# Patient Record
Sex: MALE | Race: BLACK OR AFRICAN AMERICAN
[De-identification: names, ages, dates, MRNs, and addresses within clinical notes are randomized per-mention and may not be internally consistent; named-entity substitution may affect disease eponyms.]

---

## 2019-08-12 ENCOUNTER — HOSPITAL ENCOUNTER (EMERGENCY)
Dept: HOSPITAL 61 - ER | Age: 44
Discharge: HOME | End: 2019-08-12
Payer: SELF-PAY

## 2019-08-12 VITALS — HEIGHT: 66 IN | WEIGHT: 270 LBS | BODY MASS INDEX: 43.39 KG/M2

## 2019-08-12 VITALS — SYSTOLIC BLOOD PRESSURE: 171 MMHG | DIASTOLIC BLOOD PRESSURE: 97 MMHG

## 2019-08-12 DIAGNOSIS — F17.200: ICD-10-CM

## 2019-08-12 DIAGNOSIS — J20.9: Primary | ICD-10-CM

## 2019-08-12 DIAGNOSIS — J45.909: ICD-10-CM

## 2019-08-12 PROCEDURE — 96372 THER/PROPH/DIAG INJ SC/IM: CPT

## 2019-08-12 PROCEDURE — 71045 X-RAY EXAM CHEST 1 VIEW: CPT

## 2019-08-12 PROCEDURE — 99284 EMERGENCY DEPT VISIT MOD MDM: CPT

## 2019-08-12 PROCEDURE — 94640 AIRWAY INHALATION TREATMENT: CPT

## 2019-08-12 NOTE — RAD
Chest radiograph 8/12/2019 3:08 PM

 

INDICATION: Pneumonia, chronic White cough

 

COMPARISON: None available

 

TECHNIQUE: Frontal view of the chest is provided.

 

FINDINGS:

 

The cardiomediastinal silhouette is within normal limits.

 

There are no pleural effusions. There is no pulmonary vascular congestion.

There is no pneumothorax.

 

The lungs are clear. Minimal subsegmental atelectasis at the right lung 

base. Bronchial wall thickening may reflect bronchitis.

 

No significant osseous abnormality is identified.

 

IMPRESSION:

 

Bronchial wall thickening suggestive of bronchitis. There is suggestion of

subsegmental atelectasis at the right lung base.

 

Electronically signed by: Charissa Meyers MD (8/12/2019 3:24 PM) 

[id number]

## 2020-01-01 ENCOUNTER — HOSPITAL ENCOUNTER (EMERGENCY)
Dept: HOSPITAL 61 - ER | Age: 45
Discharge: HOME | End: 2020-01-01
Payer: SELF-PAY

## 2020-01-01 VITALS
DIASTOLIC BLOOD PRESSURE: 98 MMHG | DIASTOLIC BLOOD PRESSURE: 98 MMHG | DIASTOLIC BLOOD PRESSURE: 98 MMHG | SYSTOLIC BLOOD PRESSURE: 154 MMHG | SYSTOLIC BLOOD PRESSURE: 154 MMHG | SYSTOLIC BLOOD PRESSURE: 154 MMHG | DIASTOLIC BLOOD PRESSURE: 98 MMHG | SYSTOLIC BLOOD PRESSURE: 154 MMHG

## 2020-01-01 VITALS — BODY MASS INDEX: 43.32 KG/M2 | HEIGHT: 65 IN | WEIGHT: 260 LBS

## 2020-01-01 DIAGNOSIS — Z79.82: ICD-10-CM

## 2020-01-01 DIAGNOSIS — Z98.890: ICD-10-CM

## 2020-01-01 DIAGNOSIS — Z87.448: ICD-10-CM

## 2020-01-01 DIAGNOSIS — F17.210: ICD-10-CM

## 2020-01-01 DIAGNOSIS — R07.89: ICD-10-CM

## 2020-01-01 DIAGNOSIS — M54.12: Primary | ICD-10-CM

## 2020-01-01 DIAGNOSIS — J40: ICD-10-CM

## 2020-01-01 DIAGNOSIS — J45.909: ICD-10-CM

## 2020-01-01 DIAGNOSIS — F12.90: ICD-10-CM

## 2020-01-01 LAB
ALBUMIN SERPL-MCNC: 3.7 G/DL (ref 3.4–5)
ALBUMIN/GLOB SERPL: 1 {RATIO} (ref 1–1.7)
ALP SERPL-CCNC: 49 U/L (ref 46–116)
ALT SERPL-CCNC: 31 U/L (ref 16–63)
ANION GAP SERPL CALC-SCNC: 5 MMOL/L (ref 6–14)
AST SERPL-CCNC: 21 U/L (ref 15–37)
BASOPHILS # BLD AUTO: 0.1 X10^3/UL (ref 0–0.2)
BASOPHILS NFR BLD: 1 % (ref 0–3)
BILIRUB SERPL-MCNC: 0.4 MG/DL (ref 0.2–1)
BUN SERPL-MCNC: 10 MG/DL (ref 8–26)
BUN/CREAT SERPL: 10 (ref 6–20)
CALCIUM SERPL-MCNC: 9.3 MG/DL (ref 8.5–10.1)
CHLORIDE SERPL-SCNC: 105 MMOL/L (ref 98–107)
CK SERPL-CCNC: 341 U/L (ref 39–308)
CO2 SERPL-SCNC: 29 MMOL/L (ref 21–32)
CREAT SERPL-MCNC: 1 MG/DL (ref 0.7–1.3)
EOSINOPHIL NFR BLD: 0.2 X10^3/UL (ref 0–0.7)
EOSINOPHIL NFR BLD: 2 % (ref 0–3)
ERYTHROCYTE [DISTWIDTH] IN BLOOD BY AUTOMATED COUNT: 13.5 % (ref 11.5–14.5)
GFR SERPLBLD BASED ON 1.73 SQ M-ARVRAT: 97.3 ML/MIN
GLOBULIN SER-MCNC: 3.8 G/DL (ref 2.2–3.8)
GLUCOSE SERPL-MCNC: 93 MG/DL (ref 70–99)
HCT VFR BLD CALC: 46.3 % (ref 39–53)
HGB BLD-MCNC: 15.6 G/DL (ref 13–17.5)
INFLUENZA A PATIENT: NEGATIVE
INFLUENZA B PATIENT: NEGATIVE
LIPASE: 64 U/L (ref 73–393)
LYMPHOCYTES # BLD: 1.9 X10^3/UL (ref 1–4.8)
LYMPHOCYTES NFR BLD AUTO: 18 % (ref 24–48)
MAGNESIUM SERPL-MCNC: 2 MG/DL (ref 1.8–2.4)
MCH RBC QN AUTO: 31 PG (ref 25–35)
MCHC RBC AUTO-ENTMCNC: 34 G/DL (ref 31–37)
MCV RBC AUTO: 92 FL (ref 79–100)
MONO #: 0.9 X10^3/UL (ref 0–1.1)
MONOCYTES NFR BLD: 9 % (ref 0–9)
NEUT #: 7.5 X10^3/UL (ref 1.8–7.7)
NEUTROPHILS NFR BLD AUTO: 71 % (ref 31–73)
PLATELET # BLD AUTO: 273 X10^3/UL (ref 140–400)
POTASSIUM SERPL-SCNC: 4.2 MMOL/L (ref 3.5–5.1)
PROT SERPL-MCNC: 7.5 G/DL (ref 6.4–8.2)
RBC # BLD AUTO: 5.01 X10^6/UL (ref 4.3–5.7)
SODIUM SERPL-SCNC: 139 MMOL/L (ref 136–145)
WBC # BLD AUTO: 10.5 X10^3/UL (ref 4–11)

## 2020-01-01 PROCEDURE — 83880 ASSAY OF NATRIURETIC PEPTIDE: CPT

## 2020-01-01 PROCEDURE — 71046 X-RAY EXAM CHEST 2 VIEWS: CPT

## 2020-01-01 PROCEDURE — 99285 EMERGENCY DEPT VISIT HI MDM: CPT

## 2020-01-01 PROCEDURE — 96374 THER/PROPH/DIAG INJ IV PUSH: CPT

## 2020-01-01 PROCEDURE — 36415 COLL VENOUS BLD VENIPUNCTURE: CPT

## 2020-01-01 PROCEDURE — 80053 COMPREHEN METABOLIC PANEL: CPT

## 2020-01-01 PROCEDURE — 83690 ASSAY OF LIPASE: CPT

## 2020-01-01 PROCEDURE — 83735 ASSAY OF MAGNESIUM: CPT

## 2020-01-01 PROCEDURE — 82553 CREATINE MB FRACTION: CPT

## 2020-01-01 PROCEDURE — 87804 INFLUENZA ASSAY W/OPTIC: CPT

## 2020-01-01 PROCEDURE — 96375 TX/PRO/DX INJ NEW DRUG ADDON: CPT

## 2020-01-01 PROCEDURE — 84484 ASSAY OF TROPONIN QUANT: CPT

## 2020-01-01 PROCEDURE — 93005 ELECTROCARDIOGRAM TRACING: CPT

## 2020-01-01 PROCEDURE — 85025 COMPLETE CBC W/AUTO DIFF WBC: CPT

## 2020-01-01 RX ADMIN — DEXAMETHASONE SODIUM PHOSPHATE ONE MG: 4 INJECTION, SOLUTION INTRAMUSCULAR; INTRAVENOUS at 14:15

## 2020-01-01 RX ADMIN — BACITRACIN ONE MLS/HR: 5000 INJECTION, POWDER, FOR SOLUTION INTRAMUSCULAR at 14:14

## 2020-01-01 RX ADMIN — ASPIRIN 325 MG ORAL TABLET ONE MG: 325 PILL ORAL at 13:15

## 2020-01-01 RX ADMIN — ORPHENADRINE CITRATE ONE MG: 60 INJECTION INTRAMUSCULAR; INTRAVENOUS at 14:14

## 2020-01-01 NOTE — PHYS DOC
Past Medical History


Past Medical History:  Asthma, Kidney Infection


Past Surgical History:  Other


Additional Past Surgical Histo:  right rotator cuff repair


Smoking:  Cigarettes


Alcohol Use:  Occasionally


Drug Use:  Marijuana





Adult General


Chief Complaint


Chief Complaint:  CHEST PAIN





HPI


HPI





Patient is a 44  year old male with Tob use and back arthritis who presents with

dull chest pain. His arms experienced some numbness and tingling and then 

radiates to the left chest about 2 days ago. Yesterday he also began to feel 

some left neck tightness. Denies any trauma to the area. Pt endorses having some

URI symptoms last week. Nothing makes it worse or better. Denies any SOB, pa

lpitation, N/V, recent travel or sick contact.





Review of Systems


Review of Systems


Constitutional: Denies fever or chills 


Eyes: Denies redness or eye pain 


HENT: Denies nasal congestion or sore throat


Respiratory: Positive cough. Denies shortness of breath 


Cardiovascular: Positive chest pain. No palpitations


GI: Denies abdominal pain, nausea, or vomiting


: Denies dysuria or hematuria. 


Musculoskeletal: Denies back pain or joint pain


Integument: Denies rash or skin lesions 


Neurologic: Denies headache, focal weakness or sensory changes





Complete systems were reviewed and found to be within normal limits, except as 

documented in this note.





Current Medications


Current Medications





Current Medications








 Medications


  (Trade)  Dose


 Ordered  Sig/Jesus  Start Time


 Stop Time Status Last Admin


Dose Admin


 


 Aspirin


  (Altagracia Aspirin)  325 mg  1X  ONCE  1/1/20 13:15


 1/1/20 13:16 DC 1/1/20 13:15


325 MG


 


 Dexamethasone


 Sodium Phosphate


  (Decadron)  10 mg  1X  ONCE  1/1/20 13:15


 1/1/20 13:16 DC 1/1/20 14:15


10 MG


 


 Orphenadrine


 Citrate


  (Norflex)  60 mg  1X  ONCE  1/1/20 13:15


 1/1/20 13:16 DC 1/1/20 14:14


60 MG


 


 Sodium Chloride  1,000 ml @ 


 1,000 mls/hr  1X  ONCE  1/1/20 13:15


 1/1/20 14:14 DC 1/1/20 14:14


1,000 MLS/HR











Allergies


Allergies





Allergies








Coded Allergies Type Severity Reaction Last Updated Verified


 


  No Known Drug Allergies    12/17/13 No











Physical Exam


Physical Exam





Constitutional: Well developed, well nourished, no acute distress, non-toxic 

appearance. []


HENT: Normocephalic, atraumatic, bilateral external ears normal, oropharynx mo

ist, no oral exudates, nose normal. []


Eyes: PERRLA, EOMI, conjunctiva normal, no discharge. [] 


Neck: Normal range of motion, no tenderness, supple, no stridor. [] 


Cardiovascular:Heart rate regular rhythm, no murmur []


Lungs & Thorax:  Bilateral breath sounds clear to auscultation []


Abdomen: Bowel sounds normal, soft, no tenderness, no masses, no pulsatile 

masses. [] 


Skin: Warm, dry, no erythema, no rash. [] 


Back: No tenderness, no CVA tenderness. [] 


Extremities: No tenderness, no cyanosis, no clubbing, ROM intact, no edema. [] 


Neurologic: Alert and oriented X 3, normal motor function, normal sensory 

function, no focal deficits noted. []


Psychologic: Affect normal, judgement normal, mood normal. []





Current Patient Data


Vital Signs





                                   Vital Signs








  Date Time  Temp Pulse Resp B/P (MAP) Pulse Ox O2 Delivery O2 Flow Rate FiO2


 


1/1/20 12:47 98.3 97 16 155/97 (116) 98 Room Air  





 98.3       








Lab Values





                                Laboratory Tests








Test


 1/1/20


12:55 1/1/20


14:32


 


White Blood Count


 10.5 x10^3/uL


(4.0-11.0) 





 


Red Blood Count


 5.01 x10^6/uL


(4.30-5.70) 





 


Hemoglobin


 15.6 g/dL


(13.0-17.5) 





 


Hematocrit


 46.3 %


(39.0-53.0) 





 


Mean Corpuscular Volume


 92 fL ()


 





 


Mean Corpuscular Hemoglobin 31 pg (25-35)   


 


Mean Corpuscular Hemoglobin


Concent 34 g/dL


(31-37) 





 


Red Cell Distribution Width


 13.5 %


(11.5-14.5) 





 


Platelet Count


 273 x10^3/uL


(140-400) 





 


Neutrophils (%) (Auto) 71 % (31-73)   


 


Lymphocytes (%) (Auto) 18 % (24-48)  L 


 


Monocytes (%) (Auto) 9 % (0-9)   


 


Eosinophils (%) (Auto) 2 % (0-3)   


 


Basophils (%) (Auto) 1 % (0-3)   


 


Neutrophils # (Auto)


 7.5 x10^3/uL


(1.8-7.7) 





 


Lymphocytes # (Auto)


 1.9 x10^3/uL


(1.0-4.8) 





 


Monocytes # (Auto)


 0.9 x10^3/uL


(0.0-1.1) 





 


Eosinophils # (Auto)


 0.2 x10^3/uL


(0.0-0.7) 





 


Basophils # (Auto)


 0.1 x10^3/uL


(0.0-0.2) 





 


Sodium Level


 139 mmol/L


(136-145) 





 


Potassium Level


 4.2 mmol/L


(3.5-5.1) 





 


Chloride Level


 105 mmol/L


() 





 


Carbon Dioxide Level


 29 mmol/L


(21-32) 





 


Anion Gap 5 (6-14)  L 


 


Blood Urea Nitrogen


 10 mg/dL


(8-26) 





 


Creatinine


 1.0 mg/dL


(0.7-1.3) 





 


Estimated GFR


(Cockcroft-Gault) 97.3  


 





 


BUN/Creatinine Ratio 10 (6-20)   


 


Glucose Level


 93 mg/dL


(70-99) 





 


Calcium Level


 9.3 mg/dL


(8.5-10.1) 





 


Magnesium Level


 2.0 mg/dL


(1.8-2.4) 





 


Total Bilirubin


 0.4 mg/dL


(0.2-1.0) 





 


Aspartate Amino Transferase


(AST) 21 U/L (15-37)


 





 


Alanine Aminotransferase (ALT)


 31 U/L (16-63)


 





 


Alkaline Phosphatase


 49 U/L


() 





 


Creatine Kinase


 341 U/L


()  H 





 


Creatine Kinase MB (Mass)


 5.3 ng/mL


(0.0-3.6)  H 





 


Creatine Kinase MB Relative


Index 1.6 % (0-4)  


 





 


Troponin I Quantitative


 < 0.017 ng/mL


(0.000-0.055) 





 


NT-Pro-B-Type Natriuretic


Peptide 76 pg/mL


(0-124) 





 


Total Protein


 7.5 g/dL


(6.4-8.2) 





 


Albumin


 3.7 g/dL


(3.4-5.0) 





 


Albumin/Globulin Ratio 1.0 (1.0-1.7)   


 


Lipase


 64 U/L


()  L 





 


Influenza Type A Antigen


 


 Negative


(NEGATIVE)


 


Influenza Type B Antigen


 


 Negative


(NEGATIVE)





                                Laboratory Tests


1/1/20 12:55








                                Laboratory Tests


1/1/20 12:55














EKG


EKG


12:48:16 Sinus rhythm, incomplete RBBB. no ST elevation.





Radiology/Procedures


Radiology/Procedures


[]





Course & Med Decision Making


Course & Med Decision Making


Pertinent Labs and Imaging studies reviewed. (See chart for details)





Patient is a 44 year old male with Tob use and back arthritis who presents with 

chest pain. DDX: bronchitis, PNA, ACS, cervical radiculopathy, flu. Labs were 

unremarkable, negative for flu. CXR shows no acute process. EKG was normal. Will

 most likely bronchitis and cervical radiculopathy. Will discharge with muscle 

relaxer, steroid and pain meds. Discussed plan with pts, discharged with return 

precaution and follow up.





Dragon Disclaimer


Dragon Disclaimer


This electronic medical record was generated, in whole or in part, using a voice

 recognition dictation system.





Departure


Departure


Impression:  


   Primary Impression:  


   Cervical radicular pain


   Additional Impression:  


   Bronchitis


Disposition:  01 HOME, SELF-CARE


Condition:  STABLE


Referrals:  


NO PCP (PCP)








ELISHA QUEVEDO MD


Patient Instructions:  Acute Bronchitis, Easy-to-Read, Cervical Radiculopathy, 

Easy-to-Read


Scripts


Hydrocodone/Apap 5-325 (NORCO 5-325 TABLET) 1 Each Tablet


0.5-1 TAB PO PRN Q6HRS PRN for PAIN, #10 TAB 0 Refills


   Prov: PADMINI RODRIGUEZ DO         1/1/20 


Orphenadrine Citrate (ORPHENADRINE CITRATE) 100 Mg Tablet.er


100 MG PO BID PRN for MUSCLE PAIN, #14 TAB


   Prov: PADMINI RODRIGUEZ DO         1/1/20 


Albuterol Sulfate (PROAIR HFA INHALER) 8.5 Gm Hfa.aer.ad


2 PUFF IH PRN Q4-6HRS PRN for wheezing for 21 Days, #1 INHALER 0 Refills


   Prov: PADMINI RODRIGUEZ DO         1/1/20 


Prednisone (PREDNISONE) 20 Mg Tablet


2 TAB PO DAILY, #8 TAB


   Start this prescription tomorrow, Thursday 1/2/20


   Prov: PADMINI RODRIGUEZ DO         1/1/20





The HEART Score for CP Pts


HEART Score for Chest Pain:  








HEART Score for Chest Pain Response (Comments) Value


 


History Slighlty/Non-Suspicious 0


 


ECG Nonspecific Repolarizatio 1


 


Age >45 - < 65 1


 


Risk Factors                            1 or 2 Risk Factors 1


 


Troponin < Normal Limit 0


 


Total  3








Risk Factors:


Risk Factors:  DM, Current or recent (<one month) smoker, HTN, HLP, family 

history of CAD, obesity.


Risk Scores:


Score 0 - 3:  2.5% MACE over next 6 weeks - Discharge Home


Score 4 - 6:  20.3% MACE over next 6 weeks - Admit for Clinical Observation


Score 7 - 10:  72.7% MACE over next 6 weeks - Early Invasive Strategies





Problem Qualifiers











PADMINI RODRIGUEZ DO              Jan 1, 2020 15:13

## 2020-01-01 NOTE — RAD
CHEST PA   LATERAL

 

History: Chest pain.

 

COMPARISON: 8/12/2019.

 

 

FINDINGS:

 

Cardiomediastinal silhouettes are not enlarged. No evidence of 

pneumothorax. No pleural effusion. No consolidating infiltrate. Bones 

appear grossly intact.

 

The aorta is mildly tortuous and ectatic. This appears similar to prior 

study.

 

IMPRESSION: No evidence of consolidating infiltrate.

 

Electronically signed by: Radu Hyman MD (1/1/2020 1:40 PM) [facility]

## 2020-01-02 NOTE — EKG
[facility]

              [address] [place] [address]

Test Date:    2020               Test Time:    12:48:16

Pat Name:     DUANE MAXWELLJR          Department:   

Patient ID:   [id number]           Room:          

Gender:       M                        Technician:   

:          [id number]               Requested By: PADMINI RODRIGUEZ

Order Number: [id number]           Reading MD:     

                                 Measurements

Intervals                              Axis          

Rate:         91                       P:            66

PA:           152                      QRS:          -77

QRSD:         130                      T:            30

QT:           394                                    

QTc:          492                                    

                           Interpretive Statements

SINUS RHYTHM

WPW PATTERN, TYPE A

ABNORMAL LEFT AXIS DEVIATION

ABNORMAL ECG

No previous ECG available for comparison

## 2020-07-13 ENCOUNTER — HOSPITAL ENCOUNTER (OUTPATIENT)
Dept: HOSPITAL 61 - KCIC | Age: 45
Discharge: HOME | End: 2020-07-13
Attending: FAMILY MEDICINE
Payer: COMMERCIAL

## 2020-07-13 DIAGNOSIS — M43.17: Primary | ICD-10-CM

## 2020-07-13 DIAGNOSIS — I65.29: ICD-10-CM

## 2020-07-13 DIAGNOSIS — M54.2: ICD-10-CM

## 2020-07-13 DIAGNOSIS — M51.37: ICD-10-CM

## 2020-07-13 DIAGNOSIS — M40.50: ICD-10-CM

## 2020-07-13 DIAGNOSIS — M25.78: ICD-10-CM

## 2020-07-13 PROCEDURE — 72040 X-RAY EXAM NECK SPINE 2-3 VW: CPT

## 2020-07-13 PROCEDURE — 72110 X-RAY EXAM L-2 SPINE 4/>VWS: CPT

## 2020-07-13 NOTE — KCIC
4 views the cervical spine without comparison for neck pain.

 

FINDINGS: There is straightening of the normal cervical lordosis. No 

fracture or acute osseous abnormality is identified. Intervertebral disc 

spaces are well-maintained. Prevertebral soft tissues are grossly 

unremarkable. Incidentally noted is some calcification of the right 

lateral neck possibly representing carotid artery calcification. Clinical 

correlation consideration for carotid artery duplex ultrasound study in 

this young patient is recommended.

 

IMPRESSION:

1. Straightening of normal cervical lordosis with no acute osseous or 

alignment abnormality.

2. Right neck calcification concerning for carotid artery atherosclerosis.

Correlate clinically and consider further evaluation with duplex 

ultrasound study study if warranted.

 

Electronically signed by: Rudy Stock MD (7/13/2020 4:42 PM) [id number]

## 2020-07-13 NOTE — KCIC
5 views lumbar spine without comparison for back pain.

 

FINDINGS: There is grade 1 anterolisthesis of L5 on S1, with moderate 

narrowing of this disc space. Remaining intervertebral disc spaces are 

well-maintained. Bridging anterior osteophytes are seen at L2-3 and L3-4. 

Facet arthrosis is present lower lumbar levels. Difficult to tell from the

oblique radiographs whether pars defects are present, however there are 

suspected.

 

IMPRESSION:

1. Multilevel degenerative disc disease, most notable at L5-S1.

2. Grade 1 anterolisthesis of L5 on S1. Bilateral pars defects are 

suspected though not definitively depicted.

 

Electronically signed by: Rudy Stock MD (7/13/2020 4:44 PM) [id number]

## 2020-07-30 ENCOUNTER — HOSPITAL ENCOUNTER (OUTPATIENT)
Dept: HOSPITAL 61 - US | Age: 45
Discharge: HOME | End: 2020-07-30
Attending: FAMILY MEDICINE
Payer: COMMERCIAL

## 2020-07-30 DIAGNOSIS — I65.23: Primary | ICD-10-CM

## 2020-07-30 PROCEDURE — 93880 EXTRACRANIAL BILAT STUDY: CPT

## 2020-07-30 NOTE — RAD
EXAM: Carotid Doppler sonogram.

 

HISTORY: Carotid atherosclerosis.

 

TECHNIQUE: Gray scale and color Doppler sonographic evaluation of the neck

with spectral waveform analysis was performed and static images are 

submitted for review. 

 

FINDINGS: There is mild bilateral common carotid artery intimal 

thickening.

 

The peak systolic velocity within the right common carotid artery is 98 

cm/sec. The peak systolic velocity within the right internal carotid 

artery is 73 cm/sec and the end diastolic velocity within the right 

internal carotid artery is 31 cm/sec. The right ICA/CCA ratio is 0.74.

 

The peak systolic velocity within the left common carotid artery is 94 

cm/sec. The peak systolic velocity within the left internal carotid artery

is 74 cm/sec and the end diastolic velocity within the left internal 

carotid artery is 31 cm/sec. The left ICA/CCA ratio is 0.79.

 

There is normal antegrade flow within both vertebral arteries.

 

IMPRESSION: No Doppler evidence of hemodynamically significant stenosis.

 

 

PQRS Compliance Statement - Stenosis calculations for CT, MR and 

conventional angiography are based upon measurement of the distal ICA 

diameter in accordance with the NASCET methodology.  Stenosis calculations

for carotid ultrasound studies are derived from validated velocity 

criteria which are known to correlate with the NASCET methodology.

 

Electronically signed by: Camille Sandoval MD (7/30/2020 8:02 AM) [id number]

## 2020-10-29 ENCOUNTER — HOSPITAL ENCOUNTER (OUTPATIENT)
Dept: HOSPITAL 61 - KCIC | Age: 45
End: 2020-10-29
Attending: FAMILY MEDICINE
Payer: COMMERCIAL

## 2020-10-29 DIAGNOSIS — M47.817: Primary | ICD-10-CM

## 2020-10-29 DIAGNOSIS — M48.07: ICD-10-CM

## 2020-10-29 DIAGNOSIS — G89.29: ICD-10-CM

## 2020-10-29 DIAGNOSIS — M79.606: ICD-10-CM

## 2020-10-29 DIAGNOSIS — M43.17: ICD-10-CM

## 2020-10-29 PROCEDURE — 72110 X-RAY EXAM L-2 SPINE 4/>VWS: CPT
